# Patient Record
Sex: FEMALE | Race: WHITE
[De-identification: names, ages, dates, MRNs, and addresses within clinical notes are randomized per-mention and may not be internally consistent; named-entity substitution may affect disease eponyms.]

---

## 2017-10-04 ENCOUNTER — HOSPITAL ENCOUNTER (OUTPATIENT)
Dept: HOSPITAL 75 - RAD | Age: 52
End: 2017-10-04
Attending: NURSE PRACTITIONER
Payer: COMMERCIAL

## 2017-10-04 DIAGNOSIS — Z12.31: Primary | ICD-10-CM

## 2017-10-04 PROCEDURE — 77067 SCR MAMMO BI INCL CAD: CPT

## 2017-10-04 NOTE — DIAGNOSTIC IMAGING REPORT
EXAMINATION: Bilateral screening mammogram 2D views with

tomosynthesis



The current study was also evaluated with a Computer Aided

Detection (CAD) system.



INDICATION: Screening. No current complaints stated on the

questionnaire.



COMPARISON: 10/2/2007.



FINDINGS: There are symmetric bilateral retroglandular breast

implants similar to 2007 exam. Scattered fibroglandular densities

are seen. There is a medial right breast nodule, stable from

2007, less than 1 cm in size with circumscribed margins

compatible with benign etiology. Allowing for technique and

positional differences, no suspicious change is seen.



IMPRESSION: No significant change.



ACR BI-RADS Category 2: Benign findings.

Result letter will be mailed to the patient.

Note: At least 10% of breast cancer is not imaged by mammography.



Dictated by: 



  Dictated on workstation # [id number]

## 2022-01-11 ENCOUNTER — HOSPITAL ENCOUNTER (OUTPATIENT)
Dept: HOSPITAL 75 - RAD | Age: 57
End: 2022-01-11
Attending: FAMILY MEDICINE
Payer: COMMERCIAL

## 2022-01-11 DIAGNOSIS — M81.0: Primary | ICD-10-CM

## 2022-01-11 DIAGNOSIS — Z78.0: ICD-10-CM

## 2022-01-11 PROCEDURE — 77080 DXA BONE DENSITY AXIAL: CPT
